# Patient Record
Sex: MALE | Race: WHITE | NOT HISPANIC OR LATINO
[De-identification: names, ages, dates, MRNs, and addresses within clinical notes are randomized per-mention and may not be internally consistent; named-entity substitution may affect disease eponyms.]

---

## 2019-02-27 ENCOUNTER — APPOINTMENT (OUTPATIENT)
Dept: PULMONOLOGY | Facility: CLINIC | Age: 64
End: 2019-02-27
Payer: COMMERCIAL

## 2019-02-27 VITALS
HEIGHT: 70 IN | WEIGHT: 212 LBS | OXYGEN SATURATION: 95 % | BODY MASS INDEX: 30.35 KG/M2 | HEART RATE: 54 BPM | SYSTOLIC BLOOD PRESSURE: 140 MMHG | DIASTOLIC BLOOD PRESSURE: 80 MMHG

## 2019-02-27 DIAGNOSIS — Z86.79 PERSONAL HISTORY OF OTHER DISEASES OF THE CIRCULATORY SYSTEM: ICD-10-CM

## 2019-02-27 DIAGNOSIS — F17.200 NICOTINE DEPENDENCE, UNSPECIFIED, UNCOMPLICATED: ICD-10-CM

## 2019-02-27 DIAGNOSIS — Z86.39 PERSONAL HISTORY OF OTHER ENDOCRINE, NUTRITIONAL AND METABOLIC DISEASE: ICD-10-CM

## 2019-02-27 DIAGNOSIS — Z87.39 PERSONAL HISTORY OF OTHER DISEASES OF THE MUSCULOSKELETAL SYSTEM AND CONNECTIVE TISSUE: ICD-10-CM

## 2019-02-27 PROCEDURE — 99203 OFFICE O/P NEW LOW 30 MIN: CPT | Mod: 25

## 2019-02-27 PROCEDURE — 99406 BEHAV CHNG SMOKING 3-10 MIN: CPT

## 2019-02-27 RX ORDER — OMEPRAZOLE 20 MG/1
20 CAPSULE, DELAYED RELEASE ORAL
Refills: 0 | Status: ACTIVE | COMMUNITY

## 2019-02-27 RX ORDER — QUINAPRIL HYDROCHLORIDE 40 MG/1
40 TABLET, FILM COATED ORAL
Refills: 0 | Status: ACTIVE | COMMUNITY

## 2019-02-27 RX ORDER — MELOXICAM 15 MG/1
15 TABLET ORAL
Refills: 0 | Status: ACTIVE | COMMUNITY

## 2019-02-27 RX ORDER — EZETIMIBE 10 MG/1
10 TABLET ORAL
Refills: 0 | Status: ACTIVE | COMMUNITY

## 2019-02-27 RX ORDER — MULTIVIT-MIN/FOLIC/VIT K/LYCOP 400-300MCG
25 MCG TABLET ORAL
Refills: 0 | Status: ACTIVE | COMMUNITY

## 2019-02-27 RX ORDER — MULTIVIT-MIN/FOLIC/VIT K/LYCOP 400-300MCG
1000 TABLET ORAL
Refills: 0 | Status: ACTIVE | COMMUNITY

## 2019-02-27 RX ORDER — AMINO ACIDS
CAPSULE ORAL
Refills: 0 | Status: ACTIVE | COMMUNITY

## 2019-02-27 NOTE — REVIEW OF SYSTEMS
[As Noted in HPI] : as noted in HPI [Back Pain] : ~T back pain [Arthralgias] : arthralgias [Negative] : Constitutional

## 2019-02-27 NOTE — DISCUSSION/SUMMARY
[FreeTextEntry1] : COPD with anatomic emphysema, complicated by Polycythemia, nicotine addiction\par Nonspecific groundglass infiltrates noted, no current active sputum or fevers\par Will repeat CT scan and reevaluate\par Minute lung nodules will need followup scan also\par Patient could not stay for full PFTs, we'll perform an a.m.\par Normal pulse oximetry on room air, he did not desaturate with exercise\par also followed by Cardiology\par Further evaluation pending above\par Smoking cessation discussed, nicotine replacement 4 minutes\par

## 2019-02-27 NOTE — PROCEDURE
[FreeTextEntry1] : CT scan reviewed; 12/18\par Coronary calcifications\par Significant centrilobular emphysema\par Mild aneurysmal dilatation of aorta at 4.1\par Mild groundglass abnormalities right upper lobe , minimal at lung bases- more atelectasis looking\par Right upper lobe nodule and RML nodule

## 2019-02-27 NOTE — CONSULT LETTER
[Dear  ___] : Dear  [unfilled], [Consult Letter:] : I had the pleasure of evaluating your patient, [unfilled]. [Please see my note below.] : Please see my note below. [Sincerely,] : Sincerely, [FreeTextEntry3] : [facility] DO [facility]\par Pulmonary Critical Care\par Director Pulmonary Division\par Medical Director Respiratory Therapy\par [facility]\par \par

## 2019-02-27 NOTE — PHYSICAL EXAM
[General Appearance - Well Developed] : well developed [General Appearance - Well Nourished] : well nourished [Normal Conjunctiva] : the conjunctiva exhibited no abnormalities [Neck Appearance] : the appearance of the neck was normal [Heart Rate And Rhythm] : heart rate and rhythm were normal [Heart Sounds] : normal S1 and S2 [Murmurs] : no murmurs present [Edema] : no peripheral edema present [Respiration, Rhythm And Depth] : normal respiratory rhythm and effort [Exaggerated Use Of Accessory Muscles For Inspiration] : no accessory muscle use [Lungs Percussion] : the lungs were normal to percussion [FreeTextEntry1] : no chest wall abnormaility [Abnormal Walk] : normal gait [Nail Clubbing] : no clubbing of the fingernails [Cyanosis, Localized] : no localized cyanosis [] : no rash [No Focal Deficits] : no focal deficits [Oriented To Time, Place, And Person] : oriented to person, place, and time [Impaired Insight] : insight and judgment were intact [Affect] : the affect was normal [Memory Recent] : recent memory was not impaired

## 2019-02-27 NOTE — HISTORY OF PRESENT ILLNESS
[FreeTextEntry1] : Significant smoking history\par From 2 packs a day\par Now down to 3 cigarettes a day\par Being followed by hematology for polycythemia, last phlebotomy was a few weeks ago\par No fever chills or chest pain\par Followup by cardiology\par Has exertional dyspnea\par Recent CT scan obtained\par No current purulent sputum

## 2019-02-27 NOTE — REASON FOR VISIT
[Initial Evaluation] : an initial evaluation [Abnormal CT Scan] : abnormal CT Scan [COPD] : COPD [FreeTextEntry2] : lung nodule, lung infiltrate

## 2019-02-28 ENCOUNTER — APPOINTMENT (OUTPATIENT)
Dept: PULMONOLOGY | Facility: CLINIC | Age: 64
End: 2019-02-28

## 2019-02-28 ENCOUNTER — APPOINTMENT (OUTPATIENT)
Dept: PULMONOLOGY | Facility: CLINIC | Age: 64
End: 2019-02-28
Payer: COMMERCIAL

## 2019-02-28 VITALS — RESPIRATION RATE: 12 BRPM | OXYGEN SATURATION: 93 % | HEART RATE: 80 BPM

## 2019-02-28 PROCEDURE — 94729 DIFFUSING CAPACITY: CPT

## 2019-02-28 PROCEDURE — 85018 HEMOGLOBIN: CPT | Mod: QW

## 2019-02-28 PROCEDURE — 99214 OFFICE O/P EST MOD 30 MIN: CPT | Mod: 25

## 2019-02-28 PROCEDURE — 94010 BREATHING CAPACITY TEST: CPT

## 2019-02-28 PROCEDURE — 94727 GAS DIL/WSHOT DETER LNG VOL: CPT

## 2019-02-28 RX ORDER — ALBUTEROL SULFATE 90 UG/1
108 (90 BASE) AEROSOL, METERED RESPIRATORY (INHALATION)
Qty: 1 | Refills: 6 | Status: ACTIVE | COMMUNITY
Start: 2019-02-28 | End: 1900-01-01

## 2019-02-28 NOTE — PHYSICAL EXAM
[General Appearance - Well Developed] : well developed [General Appearance - Well Nourished] : well nourished [Normal Conjunctiva] : the conjunctiva exhibited no abnormalities [Neck Appearance] : the appearance of the neck was normal [Heart Rate And Rhythm] : heart rate and rhythm were normal [Heart Sounds] : normal S1 and S2 [Murmurs] : no murmurs present [Edema] : no peripheral edema present [Respiration, Rhythm And Depth] : normal respiratory rhythm and effort [Exaggerated Use Of Accessory Muscles For Inspiration] : no accessory muscle use [Lungs Percussion] : the lungs were normal to percussion [Abnormal Walk] : normal gait [Nail Clubbing] : no clubbing of the fingernails [Cyanosis, Localized] : no localized cyanosis [No Focal Deficits] : no focal deficits [Oriented To Time, Place, And Person] : oriented to person, place, and time [Impaired Insight] : insight and judgment were intact [Affect] : the affect was normal [Memory Recent] : recent memory was not impaired [] : no rash [Auscultation Breath Sounds / Voice Sounds] : lungs were clear to auscultation bilaterally [FreeTextEntry1] : no chest wall abnormaility

## 2019-02-28 NOTE — REASON FOR VISIT
[Abnormal CT Scan] : abnormal CT Scan [COPD] : COPD [Follow-Up] : a follow-up visit [FreeTextEntry2] : lung nodule, lung infiltrate

## 2019-02-28 NOTE — DISCUSSION/SUMMARY
[FreeTextEntry1] : COPD  Gold class II with asthmatic component, complicated by Polycythemia, nicotine addiction\par Nonspecific ground glass infiltrates noted, anatomic emphysema, nodules\par will treat as exacerbation\par Breo 100 daily and technique reviewed, prednisone, abx, prn rescue\par Will repeat CT scan and reevaluate\par No significant hypoxemia, but suspect smokers polycythemia and cessation stressed 4 minutes\par also followed by Cardiology, Hematology- VASILE 2 negative, normal EPO, MPL mutation neg\par Further evaluation pending above\par 4-6 weeks or sooner if needed\par

## 2019-02-28 NOTE — CONSULT LETTER
[Dear  ___] : Dear  [unfilled], [Consult Letter:] : I had the pleasure of evaluating your patient, [unfilled]. [Please see my note below.] : Please see my note below. [Sincerely,] : Sincerely, [DrHerminio  ___] : Dr. SOL [FreeTextEntry3] : Angel Carval[facility] DO [facility]\par Pulmonary Critical Care\par Director Pulmonary Division\par Medical Director Respiratory Therapy\par [facility]\par \par

## 2019-02-28 NOTE — END OF VISIT
[>50% of Time Spent on Counseling and Coordination of Care for  ___] : Greater than 50% of the encounter time was spent on counseling and coordination of care for [unfilled] [Time Spent: ___ minutes] : I have spent [unfilled] minutes of face to face time with the patient No

## 2019-03-15 ENCOUNTER — MESSAGE (OUTPATIENT)
Age: 64
End: 2019-03-15

## 2019-04-23 ENCOUNTER — APPOINTMENT (OUTPATIENT)
Dept: PULMONOLOGY | Facility: CLINIC | Age: 64
End: 2019-04-23
Payer: COMMERCIAL

## 2019-04-23 VITALS — OXYGEN SATURATION: 95 % | DIASTOLIC BLOOD PRESSURE: 70 MMHG | SYSTOLIC BLOOD PRESSURE: 110 MMHG | HEART RATE: 88 BPM

## 2019-04-23 VITALS — WEIGHT: 214 LBS | BODY MASS INDEX: 30.71 KG/M2

## 2019-04-23 PROCEDURE — 99406 BEHAV CHNG SMOKING 3-10 MIN: CPT

## 2019-04-23 PROCEDURE — 94010 BREATHING CAPACITY TEST: CPT

## 2019-04-23 PROCEDURE — 99214 OFFICE O/P EST MOD 30 MIN: CPT | Mod: 25

## 2019-04-23 RX ORDER — PREDNISONE 10 MG/1
10 TABLET ORAL
Qty: 30 | Refills: 6 | Status: DISCONTINUED | COMMUNITY
Start: 2019-02-28 | End: 2019-04-23

## 2019-04-23 RX ORDER — DOXYCYCLINE HYCLATE 100 MG/1
100 CAPSULE ORAL
Qty: 10 | Refills: 3 | Status: DISCONTINUED | COMMUNITY
Start: 2019-02-28 | End: 2019-04-23

## 2019-04-23 NOTE — PHYSICAL EXAM
[General Appearance - Well Nourished] : well nourished [General Appearance - Well Developed] : well developed [Normal Conjunctiva] : the conjunctiva exhibited no abnormalities [Neck Appearance] : the appearance of the neck was normal [Heart Rate And Rhythm] : heart rate and rhythm were normal [Murmurs] : no murmurs present [Heart Sounds] : normal S1 and S2 [Edema] : no peripheral edema present [Auscultation Breath Sounds / Voice Sounds] : lungs were clear to auscultation bilaterally [Exaggerated Use Of Accessory Muscles For Inspiration] : no accessory muscle use [Respiration, Rhythm And Depth] : normal respiratory rhythm and effort [Lungs Percussion] : the lungs were normal to percussion [Abnormal Walk] : normal gait [Cyanosis, Localized] : no localized cyanosis [Nail Clubbing] : no clubbing of the fingernails [No Focal Deficits] : no focal deficits [Oriented To Time, Place, And Person] : oriented to person, place, and time [Impaired Insight] : insight and judgment were intact [Memory Recent] : recent memory was not impaired [Affect] : the affect was normal [] : no rash [FreeTextEntry1] : no chest wall abnormaility

## 2019-04-23 NOTE — DISCUSSION/SUMMARY
[FreeTextEntry1] : COPD  Gold class II with asthmatic component, complicated by Polycythemia, nicotine addiction\par now down 5 cigarettes daily\par spirometry now normal, exam without bronchospasm, normal sp02\par dyspnea slightly worse per pt, no chest pain, irregular rhythm today\par Cardiology office called, EKG now planned, pt refused, will go in am\par Breo 100 daily , prednisone, abx, prn rescue\par repeat CT scan 3/19 with improved GG nodules, repeat 1 yr planned\par No significant hypoxemia, but suspect smokers polycythemia and cessation stressed 4 minutes\par also followed by Cardiology, Hematology- VASILE 2 negative, normal EPO, MPL mutation neg\par 4 months or sooner if needed\par

## 2019-04-23 NOTE — REASON FOR VISIT
[Follow-Up] : a follow-up visit [Abnormal CT Scan] : abnormal CT Scan [COPD] : COPD [FreeTextEntry2] : lung nodule, lung infiltrate

## 2019-04-23 NOTE — CONSULT LETTER
[Consult Letter:] : I had the pleasure of evaluating your patient, [unfilled]. [Dear  ___] : Dear  [unfilled], [Please see my note below.] : Please see my note below. [Sincerely,] : Sincerely, [DrHerminio  ___] : Dr. SOL [FreeTextEntry3] : [facility] DO [facility]\par Pulmonary Critical Care\par Director Pulmonary Division\par Medical Director Respiratory Therapy\par [facility]\par \par  [DrHerminio ___] : Dr. SOL

## 2019-04-23 NOTE — PROCEDURE
[FreeTextEntry1] : CT 3/19: decreased nodules, f/u 1 yr\par spirometry now normal, sig improvement in FVC and FEV1 from 2/19

## 2019-04-23 NOTE — HISTORY OF PRESENT ILLNESS
[FreeTextEntry1] : Significant smoking history\par From 2 packs a day\par Now down to 3 cigarettes a day\par Being followed by hematology for polycythemia, last phlebotomy was a few weeks ago\par no fever, chill, chest pain\par no sig dyspnea\par using Breo daily

## 2019-07-24 ENCOUNTER — APPOINTMENT (OUTPATIENT)
Dept: PULMONOLOGY | Facility: CLINIC | Age: 64
End: 2019-07-24
Payer: COMMERCIAL

## 2019-07-24 VITALS
BODY MASS INDEX: 29.99 KG/M2 | OXYGEN SATURATION: 94 % | HEART RATE: 80 BPM | SYSTOLIC BLOOD PRESSURE: 114 MMHG | DIASTOLIC BLOOD PRESSURE: 80 MMHG | WEIGHT: 209 LBS

## 2019-07-24 PROCEDURE — 99214 OFFICE O/P EST MOD 30 MIN: CPT

## 2019-07-24 PROCEDURE — 99406 BEHAV CHNG SMOKING 3-10 MIN: CPT

## 2019-07-24 RX ORDER — FLUTICASONE FUROATE AND VILANTEROL TRIFENATATE 100; 25 UG/1; UG/1
100-25 POWDER RESPIRATORY (INHALATION)
Qty: 1 | Refills: 3 | Status: DISCONTINUED | COMMUNITY
Start: 2019-02-28 | End: 2019-07-24

## 2019-07-24 NOTE — CONSULT LETTER
[Dear  ___] : Dear  [unfilled], [Consult Letter:] : I had the pleasure of evaluating your patient, [unfilled]. [Please see my note below.] : Please see my note below. [DrHerminio  ___] : Dr. SOL [Sincerely,] : Sincerely, [DrHerminio ___] : Dr. SOL [FreeTextEntry3] : [facility] DO [facility]\par Pulmonary Critical Care\par Director Pulmonary Division\par Medical Director Respiratory Therapy\par [facility]\par \par

## 2019-07-24 NOTE — PHYSICAL EXAM
[General Appearance - Well Developed] : well developed [Normal Conjunctiva] : the conjunctiva exhibited no abnormalities [General Appearance - Well Nourished] : well nourished [Neck Appearance] : the appearance of the neck was normal [Heart Rate And Rhythm] : heart rate and rhythm were normal [Murmurs] : no murmurs present [Heart Sounds] : normal S1 and S2 [Edema] : no peripheral edema present [Exaggerated Use Of Accessory Muscles For Inspiration] : no accessory muscle use [Respiration, Rhythm And Depth] : normal respiratory rhythm and effort [Lungs Percussion] : the lungs were normal to percussion [Abnormal Walk] : normal gait [Nail Clubbing] : no clubbing of the fingernails [Cyanosis, Localized] : no localized cyanosis [No Focal Deficits] : no focal deficits [Impaired Insight] : insight and judgment were intact [Oriented To Time, Place, And Person] : oriented to person, place, and time [Memory Recent] : recent memory was not impaired [] : no rash [Affect] : the affect was normal [FreeTextEntry1] : no chest wall abnormality

## 2019-07-24 NOTE — REASON FOR VISIT
[Follow-Up] : a follow-up visit [COPD] : COPD [Abnormal CT Scan] : abnormal CT Scan [FreeTextEntry2] : lung nodule, lung infiltrate

## 2019-07-24 NOTE — DISCUSSION/SUMMARY
[FreeTextEntry1] : COPD  Gold class II with asthmatic component, complicated by Polycythemia, nicotine addiction\par spirometry was  normal, exam without bronchospasm, normal sp02\par compliance with Breo 100 daily ,prn rescue\par repeat CT scan 3/19 with improved GG nodules, repeat 1 yr planned\par No significant hypoxemia, but suspect smokers polycythemia and cessation stressed 4 minutes\par also followed by Cardiology, Hematology- VASILE 2 negative, normal EPO, MPL mutation neg\par CXR few crackles R base, denies any acute change in status\par 4-6  months or sooner if needed with spirometry\par

## 2019-07-24 NOTE — HISTORY OF PRESENT ILLNESS
[FreeTextEntry1] : Significant smoking history\par From 2 packs a day\par Now down to 1/2  packs  a day\par Being followed by hematology for polycythemia, last phlebotomy was a few weeks ago\par no fever, chill, chest pain\par no sig dyspnea\par using Breo intermittent

## 2019-07-24 NOTE — REVIEW OF SYSTEMS
[Arthralgias] : arthralgias [As Noted in HPI] : as noted in HPI [Back Pain] : ~T back pain [Negative] : Constitutional

## 2020-01-29 ENCOUNTER — APPOINTMENT (OUTPATIENT)
Dept: PULMONOLOGY | Facility: CLINIC | Age: 65
End: 2020-01-29
Payer: COMMERCIAL

## 2020-01-29 VITALS — HEIGHT: 69 IN | BODY MASS INDEX: 30.81 KG/M2 | WEIGHT: 208 LBS

## 2020-01-29 VITALS — SYSTOLIC BLOOD PRESSURE: 110 MMHG | DIASTOLIC BLOOD PRESSURE: 84 MMHG

## 2020-01-29 VITALS — HEART RATE: 85 BPM | OXYGEN SATURATION: 94 %

## 2020-01-29 VITALS — HEIGHT: 64 IN | BODY MASS INDEX: 53.78 KG/M2 | WEIGHT: 315 LBS

## 2020-01-29 PROCEDURE — 99214 OFFICE O/P EST MOD 30 MIN: CPT | Mod: 25

## 2020-01-29 PROCEDURE — 94060 EVALUATION OF WHEEZING: CPT

## 2020-01-29 PROCEDURE — 99406 BEHAV CHNG SMOKING 3-10 MIN: CPT

## 2020-01-29 PROCEDURE — 94664 DEMO&/EVAL PT USE INHALER: CPT | Mod: 59

## 2020-01-29 NOTE — REVIEW OF SYSTEMS
[As Noted in HPI] : as noted in HPI [Negative] : Constitutional [Arthralgias] : arthralgias [Back Pain] : ~T back pain

## 2020-01-29 NOTE — PROCEDURE
[FreeTextEntry1] : CT 3/19: decreased nodules, f/u 1 yr\par spirometry with normal FVC no obstruction, minimal decrease from 4/19

## 2020-01-29 NOTE — DISCUSSION/SUMMARY
[FreeTextEntry1] : COPD  Gold class II with asthmatic component, complicated by Polycythemia, nicotine addiction\par spirometry without sig change l, exam without bronchospasm, normal sp02\par prn  Breo 100 daily ,prn rescue\par repeat CT scan 3/19 with improved GG nodules, repeat 1 yr planned 3/20\par No significant hypoxemia, but suspect smokers polycythemia and cessation stressed 4 minutes\par also followed by Cardiology, Hematology- VASILE 2 negative, normal EPO, MPL mutation neg, \par Receives phlebotomy regularly Dr Baez\par 4-6  months or sooner if needed with repeat CT\par

## 2020-01-29 NOTE — COUNSELING
[Cessation strategies including cessation program discussed] : Cessation strategies including cessation program discussed [Use of nicotine replacement therapies and other medications discussed] : Use of nicotine replacement therapies and other medications discussed [Risk of tobacco use and health benefits of smoking cessation discussed] : Risk of tobacco use and health benefits of smoking cessation discussed [Tobacco Use Cessation Intermediate Greater Than 3 Minutes Up to 10 Minutes] : Tobacco Use Cessation Intermediate Greater Than 3 Minutes Up to 10 Minutes [FreeTextEntry1] : 4

## 2020-01-29 NOTE — HISTORY OF PRESENT ILLNESS
[>= 30 pack years] : >= 30 pack years [Current] : current [TextBox_4] : Significant smoking history\par From 2 packs a day\par Now down to 1/2  packs  a day\par Being followed by hematology for polycythemia, last phlebotomy was a few weeks ago\par no fever, chill, chest pain\par no sig dyspnea\par not using Breo intermittent [TextBox_11] : 0.5

## 2020-01-29 NOTE — CONSULT LETTER
[Dear  ___] : Dear  [unfilled], [Consult Letter:] : I had the pleasure of evaluating your patient, [unfilled]. [Sincerely,] : Sincerely, [Please see my note below.] : Please see my note below. [DrHerminio ___] : Dr. SOL [DrHerminio  ___] : Dr. SOL [FreeTextEntry3] : [facility] DO [facility]\par Pulmonary Critical Care\par Director Pulmonary Division\par Medical Director Respiratory Therapy\par [facility]\par \par

## 2020-01-29 NOTE — PHYSICAL EXAM
[General Appearance - Well Developed] : well developed [General Appearance - Well Nourished] : well nourished [Normal Conjunctiva] : the conjunctiva exhibited no abnormalities [Neck Appearance] : the appearance of the neck was normal [Murmurs] : no murmurs present [Heart Rate And Rhythm] : heart rate and rhythm were normal [Heart Sounds] : normal S1 and S2 [Edema] : no peripheral edema present [Lungs Percussion] : the lungs were normal to percussion [Respiration, Rhythm And Depth] : normal respiratory rhythm and effort [Exaggerated Use Of Accessory Muscles For Inspiration] : no accessory muscle use [Nail Clubbing] : no clubbing of the fingernails [Abnormal Walk] : normal gait [Cyanosis, Localized] : no localized cyanosis [No Focal Deficits] : no focal deficits [Impaired Insight] : insight and judgment were intact [Affect] : the affect was normal [Oriented To Time, Place, And Person] : oriented to person, place, and time [] : no rash [Memory Recent] : recent memory was not impaired [FreeTextEntry1] : no chest wall abnormality

## 2020-05-27 ENCOUNTER — APPOINTMENT (OUTPATIENT)
Dept: PULMONOLOGY | Facility: CLINIC | Age: 65
End: 2020-05-27
Payer: COMMERCIAL

## 2020-05-27 VITALS
WEIGHT: 216 LBS | BODY MASS INDEX: 31.99 KG/M2 | SYSTOLIC BLOOD PRESSURE: 110 MMHG | DIASTOLIC BLOOD PRESSURE: 70 MMHG | HEART RATE: 74 BPM | HEIGHT: 69 IN | OXYGEN SATURATION: 95 %

## 2020-05-27 PROCEDURE — 99214 OFFICE O/P EST MOD 30 MIN: CPT

## 2020-05-27 NOTE — DISCUSSION/SUMMARY
[FreeTextEntry1] : COPD  Gold class II with asthmatic component, complicated by Polycythemia, nicotine addiction\par spirometry without sig change l, exam without bronchospasm, normal sp02\par prn  Breo 100 daily ,prn rescue\par repeat CT scan 3/20 with stable nodules, repeat 1 yr planned 3/20\par No significant hypoxemia, but suspect smokers polycythemia and cessation stressed 4 minutes\par  followed by Hematology- VASILE 2 negative, normal EPO, MPL mutation neg, \par Receives phlebotomy regularly Dr Baez\par Heart rate intermittently irregular, Cardiology called, they will see today\par 4-6  months or sooner if needed with PFTs\par

## 2020-05-27 NOTE — PROCEDURE
[FreeTextEntry1] : CT 3/20: stable nodules\par spirometry with normal FVC no obstruction, minimal decrease from 4/19

## 2020-05-27 NOTE — HISTORY OF PRESENT ILLNESS
[Current] : current [>= 30 pack years] : >= 30 pack years [TextBox_4] : Significant smoking history\par Now down to 1 packs  a day\par Being followed by hematology for polycythemia, last phlebotomy was a few weeks ago\par no fever, chill, chest pain\par no sig dyspnea\par  using Breo intermittent [TextBox_11] : 0.5

## 2020-05-27 NOTE — COUNSELING
[Risk of tobacco use and health benefits of smoking cessation discussed] : Risk of tobacco use and health benefits of smoking cessation discussed [Cessation strategies including cessation program discussed] : Cessation strategies including cessation program discussed [Use of nicotine replacement therapies and other medications discussed] : Use of nicotine replacement therapies and other medications discussed [FreeTextEntry1] : 4

## 2020-05-27 NOTE — CONSULT LETTER
[Dear  ___] : Dear  [unfilled], [Consult Letter:] : I had the pleasure of evaluating your patient, [unfilled]. [Please see my note below.] : Please see my note below. [Sincerely,] : Sincerely, [DrHerminio  ___] : Dr. SOL [DrHerminio ___] : Dr. SOL [FreeTextEntry3] : Angel Carvalho DO [facility]\par Pulmonary Critical Care\par Director Pulmonary Division\par Medical Director Respiratory Therapy\par [facility]\par \par

## 2020-11-04 ENCOUNTER — APPOINTMENT (OUTPATIENT)
Dept: PULMONOLOGY | Facility: CLINIC | Age: 65
End: 2020-11-04

## 2020-11-18 ENCOUNTER — APPOINTMENT (OUTPATIENT)
Dept: PULMONOLOGY | Facility: CLINIC | Age: 65
End: 2020-11-18
Payer: COMMERCIAL

## 2020-11-18 VITALS — WEIGHT: 200 LBS | BODY MASS INDEX: 29.54 KG/M2

## 2020-11-18 VITALS — HEART RATE: 83 BPM | SYSTOLIC BLOOD PRESSURE: 122 MMHG | OXYGEN SATURATION: 96 % | DIASTOLIC BLOOD PRESSURE: 80 MMHG

## 2020-11-18 DIAGNOSIS — R91.8 OTHER NONSPECIFIC ABNORMAL FINDING OF LUNG FIELD: ICD-10-CM

## 2020-11-18 PROCEDURE — 99214 OFFICE O/P EST MOD 30 MIN: CPT | Mod: 25

## 2020-11-18 PROCEDURE — 99406 BEHAV CHNG SMOKING 3-10 MIN: CPT

## 2020-11-18 RX ORDER — FLUTICASONE FUROATE AND VILANTEROL TRIFENATATE 100; 25 UG/1; UG/1
100-25 POWDER RESPIRATORY (INHALATION)
Qty: 3 | Refills: 1 | Status: DISCONTINUED | COMMUNITY
Start: 2019-04-23 | End: 2020-11-18

## 2020-11-18 NOTE — DISCUSSION/SUMMARY
[FreeTextEntry1] : COPD  Gold class II with asthmatic component, complicated by Polycythemia, nicotine addiction\par spirometry without sig change l, exam without bronchospasm, normal sp02\par prn  Breo 100 daily \par repeat CT scan 3/20 with stable nodules, repeat 1 yr planned 3/21\par No significant hypoxemia, but suspect smokers polycythemia and cessation stressed 4 minutes\par  followed by Hematology- VASILE 2 negative, normal EPO, MPL mutation neg, \par Receives phlebotomy regularly Dr Baez\par Cardiology following\par 4-6  months or sooner if needed with PFTs, will call with CT scan\par

## 2020-11-18 NOTE — PHYSICAL EXAM
[General Appearance - Well Developed] : well developed [General Appearance - Well Nourished] : well nourished [Normal Conjunctiva] : the conjunctiva exhibited no abnormalities [Neck Appearance] : the appearance of the neck was normal [Heart Rate And Rhythm] : heart rate and rhythm were normal [Heart Sounds] : normal S1 and S2 [Murmurs] : no murmurs present [Edema] : no peripheral edema present [Respiration, Rhythm And Depth] : normal respiratory rhythm and effort [Exaggerated Use Of Accessory Muscles For Inspiration] : no accessory muscle use [Lungs Percussion] : the lungs were normal to percussion [Abnormal Walk] : normal gait [Nail Clubbing] : no clubbing of the fingernails [Cyanosis, Localized] : no localized cyanosis [No Focal Deficits] : no focal deficits [Oriented To Time, Place, And Person] : oriented to person, place, and time [Impaired Insight] : insight and judgment were intact [Affect] : the affect was normal [Memory Recent] : recent memory was not impaired [] : no rash [FreeTextEntry1] : no chest wall abnormality

## 2020-11-18 NOTE — HISTORY OF PRESENT ILLNESS
[Current] : current [>= 30 pack years] : >= 30 pack years [TextBox_4] : Significant smoking history\par Now down to 1/2 packs  a day\par Being followed by hematology for polycythemia, last phlebotomy was a 4 weeks ago\par no fever, chill, chest pain\par no sig dyspnea\par  not using Breo [TextBox_11] : 0.5

## 2020-11-18 NOTE — CONSULT LETTER
[Dear  ___] : Dear  [unfilled], [Consult Letter:] : I had the pleasure of evaluating your patient, [unfilled]. [Please see my note below.] : Please see my note below. [Sincerely,] : Sincerely, [DrHerminio  ___] : Dr. SOL [DrHerminio ___] : Dr. SOL [FreeTextEntry3] : [facility]\par Pulmonary Critical Care\par Director Pulmonary Division\par Medical Director Respiratory Therapy\par [facility]\par \par

## 2020-11-18 NOTE — COUNSELING
[Risk of tobacco use and health benefits of smoking cessation discussed] : Risk of tobacco use and health benefits of smoking cessation discussed [Cessation strategies including cessation program discussed] : Cessation strategies including cessation program discussed [Use of nicotine replacement therapies and other medications discussed] : Use of nicotine replacement therapies and other medications discussed [Tobacco Use Cessation Intermediate Greater Than 3 Minutes Up to 10 Minutes] : Tobacco Use Cessation Intermediate Greater Than 3 Minutes Up to 10 Minutes [FreeTextEntry1] : 4

## 2020-11-24 ENCOUNTER — APPOINTMENT (OUTPATIENT)
Dept: GASTROENTEROLOGY | Facility: CLINIC | Age: 65
End: 2020-11-24
Payer: COMMERCIAL

## 2020-11-24 VITALS
RESPIRATION RATE: 14 BRPM | BODY MASS INDEX: 29.33 KG/M2 | OXYGEN SATURATION: 97 % | HEIGHT: 69 IN | WEIGHT: 198 LBS | SYSTOLIC BLOOD PRESSURE: 110 MMHG | DIASTOLIC BLOOD PRESSURE: 68 MMHG | TEMPERATURE: 97.6 F | HEART RATE: 51 BPM

## 2020-11-24 LAB
ALBUMIN SERPL ELPH-MCNC: 4.7 G/DL
ALP BLD-CCNC: 61 U/L
ALT SERPL-CCNC: 14 U/L
ANION GAP SERPL CALC-SCNC: 15 MMOL/L
AST SERPL-CCNC: 22 U/L
BASOPHILS # BLD AUTO: 0.15 K/UL
BASOPHILS NFR BLD AUTO: 1.9 %
BILIRUB SERPL-MCNC: 0.9 MG/DL
BUN SERPL-MCNC: 27 MG/DL
CALCIUM SERPL-MCNC: 10 MG/DL
CHLORIDE SERPL-SCNC: 99 MMOL/L
CO2 SERPL-SCNC: 20 MMOL/L
CREAT SERPL-MCNC: 1.03 MG/DL
CRP SERPL-MCNC: 0.39 MG/DL
EOSINOPHIL # BLD AUTO: 0.26 K/UL
EOSINOPHIL NFR BLD AUTO: 3.3 %
GLUCOSE SERPL-MCNC: 95 MG/DL
HCT VFR BLD CALC: 56 %
HGB BLD-MCNC: 18.3 G/DL
IMM GRANULOCYTES NFR BLD AUTO: 2.9 %
INR PPP: 1.04 RATIO
LYMPHOCYTES # BLD AUTO: 1.57 K/UL
LYMPHOCYTES NFR BLD AUTO: 20.1 %
MAN DIFF?: NORMAL
MCHC RBC-ENTMCNC: 25.9 PG
MCHC RBC-ENTMCNC: 32.7 GM/DL
MCV RBC AUTO: 79.3 FL
MONOCYTES # BLD AUTO: 0.94 K/UL
MONOCYTES NFR BLD AUTO: 12 %
NEUTROPHILS # BLD AUTO: 4.67 K/UL
NEUTROPHILS NFR BLD AUTO: 59.8 %
PLATELET # BLD AUTO: 341 K/UL
POTASSIUM SERPL-SCNC: 4.4 MMOL/L
PROT SERPL-MCNC: 7.3 G/DL
PT BLD: 12.2 SEC
RBC # BLD: 7.06 M/UL
RBC # FLD: 15.9 %
SODIUM SERPL-SCNC: 134 MMOL/L
WBC # FLD AUTO: 7.82 K/UL

## 2020-11-24 PROCEDURE — 99203 OFFICE O/P NEW LOW 30 MIN: CPT

## 2020-11-24 RX ORDER — VITAMIN A 10000 UNIT
TABLET ORAL
Refills: 0 | Status: DISCONTINUED | COMMUNITY
End: 2020-11-24

## 2020-11-24 NOTE — ASSESSMENT
[FreeTextEntry1] : Due to history of mucus in the stool, and loose stools, there is a possibility of inflammatory bowel disease. As the patient never had a colonoscopy before, I am recommending to have a colonoscopy performed. The bowel preparation was discussed at length. Risks (including bleeding, pain, perforation, incomplete examination, splenic laceration, adverse reactions to medications, aspiration and death), benefits and alternatives were discussed. Patient is agreeable for the colonoscopy. The patient is medically optimized for the procedure pending cardiology evaluation. We will schedule the patient for the procedure. Bowel preparation was sent to the pharmacy.\par \par Gabriel Rodney MD\par Gastroenterology \par \par \par

## 2020-11-24 NOTE — PHYSICAL EXAM
[General Appearance - Alert] : alert [General Appearance - In No Acute Distress] : in no acute distress [Sclera] : the sclera and conjunctiva were normal [PERRL With Normal Accommodation] : pupils were equal in size, round, and reactive to light [Extraocular Movements] : extraocular movements were intact [Outer Ear] : the ears and nose were normal in appearance [Oropharynx] : the oropharynx was normal [Neck Appearance] : the appearance of the neck was normal [Neck Cervical Mass (___cm)] : no neck mass was observed [Jugular Venous Distention Increased] : there was no jugular-venous distention [Thyroid Diffuse Enlargement] : the thyroid was not enlarged [Thyroid Nodule] : there were no palpable thyroid nodules [Auscultation Breath Sounds / Voice Sounds] : lungs were clear to auscultation bilaterally [Heart Rate And Rhythm] : heart rate was normal and rhythm regular [Heart Sounds] : normal S1 and S2 [Heart Sounds Gallop] : no gallops [Murmurs] : no murmurs [Heart Sounds Pericardial Friction Rub] : no pericardial rub [Bowel Sounds] : normal bowel sounds [Abdomen Soft] : soft [Abdomen Tenderness] : non-tender [Abdomen Mass (___ Cm)] : no abdominal mass palpated [Cervical Lymph Nodes Enlarged Posterior Bilaterally] : posterior cervical [Cervical Lymph Nodes Enlarged Anterior Bilaterally] : anterior cervical [Supraclavicular Lymph Nodes Enlarged Bilaterally] : supraclavicular [No CVA Tenderness] : no ~M costovertebral angle tenderness [No Spinal Tenderness] : no spinal tenderness [Abnormal Walk] : normal gait [Nail Clubbing] : no clubbing  or cyanosis of the fingernails [Musculoskeletal - Swelling] : no joint swelling seen [Motor Tone] : muscle strength and tone were normal [Skin Color & Pigmentation] : normal skin color and pigmentation [Skin Turgor] : normal skin turgor [] : no rash [No Focal Deficits] : no focal deficits [Oriented To Time, Place, And Person] : oriented to person, place, and time [Impaired Insight] : insight and judgment were intact [Affect] : the affect was normal [Normal Sphincter Tone] : normal sphincter tone [No Rectal Mass] : no rectal mass [Internal Hemorrhoid] : internal hemorrhoids [External Hemorrhoid] : external hemorrhoids [FreeTextEntry1] : Prolaspsed hemorrhoids

## 2020-11-24 NOTE — HISTORY OF PRESENT ILLNESS
[de-identified] : Patient arrived for evaluation for colonoscopy. He has been complaining of loose stools and mucus in stools for many years. Passes 5-6 loose BM per day. No rectal bleeding. No previous colonoscopy. He has history of COPD, CAD. No active issues currently. No weight loss. No family history of colorectal cancer.

## 2020-12-06 LAB
BACTERIA STL CULT: NORMAL
C DIFF TOX GENS STL QL NAA+PROBE: NORMAL
CDIFF BY PCR: NOT DETECTED
DEPRECATED O AND P PREP STL: NORMAL
G LAMBLIA AG STL QL: NORMAL
GI PCR PANEL, STOOL: NORMAL

## 2020-12-26 ENCOUNTER — APPOINTMENT (OUTPATIENT)
Dept: DISASTER EMERGENCY | Facility: CLINIC | Age: 65
End: 2020-12-26

## 2020-12-26 DIAGNOSIS — Z01.818 ENCOUNTER FOR OTHER PREPROCEDURAL EXAMINATION: ICD-10-CM

## 2020-12-27 LAB — SARS-COV-2 N GENE NPH QL NAA+PROBE: NOT DETECTED

## 2020-12-29 ENCOUNTER — APPOINTMENT (OUTPATIENT)
Dept: GASTROENTEROLOGY | Facility: GI CENTER | Age: 65
End: 2020-12-29
Payer: COMMERCIAL

## 2020-12-29 ENCOUNTER — OUTPATIENT (OUTPATIENT)
Dept: OUTPATIENT SERVICES | Facility: HOSPITAL | Age: 65
LOS: 1 days | End: 2020-12-29
Payer: COMMERCIAL

## 2020-12-29 ENCOUNTER — RESULT REVIEW (OUTPATIENT)
Age: 65
End: 2020-12-29

## 2020-12-29 DIAGNOSIS — R19.8 OTHER SPECIFIED SYMPTOMS AND SIGNS INVOLVING THE DIGESTIVE SYSTEM AND ABDOMEN: ICD-10-CM

## 2020-12-29 DIAGNOSIS — K64.8 OTHER HEMORRHOIDS: ICD-10-CM

## 2020-12-29 PROCEDURE — 88305 TISSUE EXAM BY PATHOLOGIST: CPT | Mod: 26

## 2020-12-29 PROCEDURE — 45385 COLONOSCOPY W/LESION REMOVAL: CPT

## 2020-12-29 PROCEDURE — 88305 TISSUE EXAM BY PATHOLOGIST: CPT

## 2020-12-29 PROCEDURE — 45380 COLONOSCOPY AND BIOPSY: CPT | Mod: XS

## 2020-12-29 PROCEDURE — 45380 COLONOSCOPY AND BIOPSY: CPT | Mod: 59

## 2020-12-29 NOTE — PROCEDURE
[With Biopsy] : with biopsy [With Polypectomy] : polypectomy [Change in Bowel Habits] : change in bowel habits [Procedure Explained] : The procedure was explained [Allergies Reviewed] : allergies reviewed. [Risks] : Risks [Benefits] : benefits [Alternatives] : alternatives [Consent Obtained] : written consent was obtained prior to the procedure and is detailed in the patient's record [Patient] : the patient [Bowel Prep Kit] : the patient took the appropriate bowel preparation kit as directed [Approved Diet Followed] : the patient avoided solid foods and adhered to the approved diet list for 24 hours prior to the procedure [Automated Blood Pressure Cuff] : automated blood pressure cuff [Cardiac Monitor] : cardiac monitor [Pulse Oximeter] : pulse oximeter [Prep Qualtiy: ___] : Prep Quality:  [unfilled] [Withdrawal Time: ___] : Withdrawal Time:  [unfilled] [Performed By: ___] : Performed by:  SWETA [Left Lateral Decubitus] : The patient was positioned in the left lateral decubitus position [Normal Prostate] : a normal prostate [Cecum (Landmarks)] : and guided to the cecum which was identified by the anatomic landmarks of the appendiceal orifice and ileocecal valve [Terminal Ileum via Ileocecal Valve] : and the terminal ileum was examined by entering the ileocecal valve [No Difficulty] : without difficulty [Insufflated] : insufflated [Single Pass Needed] : after a single pass [Retroflex View] : a retroflex view of the rectum was performed [Cold Snare Polypectomy] : cold snare polypectomy [Normal] : Normal [Diverticulosis] : diverticulosis [Polyps] : polyps [Hemorrhoids] : hemorrhoids [Biopsy] : biopsy [Sent to Pathology] : was sent to pathology for analysis [Tolerated Well] : the patient tolerated the procedure well [Vital Signs Stable] : the vital signs were stable [No Complications] : There were no complications [Abnormal Rectum] : a normal rectum [External Hemorrhoids] : no external hemorrhoids [Patient Rotated Into Alternating Positions] : the patient was not rotated [de-identified] : Mucus in stools [de-identified] : Normal terminal ileum [de-identified] : 3 mm polyp s/p cold biopsy forceps polypectomy [de-identified] : Two 5 mm polyps s/p cold snare\par One 3 mm polyp s/p cold biopsy forceps polypectomy [de-identified] : One 5 mm polyps s/p cold snare\par Two 3 mm polyp s/p cold biopsy forceps polypectomy [de-identified] : Ascending colon polyp; Transverse colon polyps; Sigmoid colon polyps; Random colon; Rectum

## 2020-12-29 NOTE — PHYSICAL EXAM
[General Appearance - Alert] : alert [General Appearance - In No Acute Distress] : in no acute distress [Sclera] : the sclera and conjunctiva were normal [PERRL With Normal Accommodation] : pupils were equal in size, round, and reactive to light [Extraocular Movements] : extraocular movements were intact [Outer Ear] : the ears and nose were normal in appearance [Oropharynx] : the oropharynx was normal [Neck Appearance] : the appearance of the neck was normal [Neck Cervical Mass (___cm)] : no neck mass was observed [Jugular Venous Distention Increased] : there was no jugular-venous distention [Thyroid Diffuse Enlargement] : the thyroid was not enlarged [Thyroid Nodule] : there were no palpable thyroid nodules [Auscultation Breath Sounds / Voice Sounds] : lungs were clear to auscultation bilaterally [Heart Rate And Rhythm] : heart rate was normal and rhythm regular [Heart Sounds] : normal S1 and S2 [Heart Sounds Gallop] : no gallops [Murmurs] : no murmurs [Heart Sounds Pericardial Friction Rub] : no pericardial rub [Bowel Sounds] : normal bowel sounds [Abdomen Soft] : soft [Abdomen Tenderness] : non-tender [Abdomen Mass (___ Cm)] : no abdominal mass palpated [Normal Sphincter Tone] : normal sphincter tone [No Rectal Mass] : no rectal mass [Internal Hemorrhoid] : internal hemorrhoids [External Hemorrhoid] : external hemorrhoids [FreeTextEntry1] : Prolaspsed hemorrhoids [Cervical Lymph Nodes Enlarged Posterior Bilaterally] : posterior cervical [Cervical Lymph Nodes Enlarged Anterior Bilaterally] : anterior cervical [Supraclavicular Lymph Nodes Enlarged Bilaterally] : supraclavicular [No CVA Tenderness] : no ~M costovertebral angle tenderness [No Spinal Tenderness] : no spinal tenderness [Abnormal Walk] : normal gait [Nail Clubbing] : no clubbing  or cyanosis of the fingernails [Musculoskeletal - Swelling] : no joint swelling seen [Motor Tone] : muscle strength and tone were normal [Skin Color & Pigmentation] : normal skin color and pigmentation [Skin Turgor] : normal skin turgor [] : no rash [No Focal Deficits] : no focal deficits [Oriented To Time, Place, And Person] : oriented to person, place, and time [Impaired Insight] : insight and judgment were intact [Affect] : the affect was normal

## 2020-12-29 NOTE — PROCEDURE
[With Biopsy] : with biopsy [With Polypectomy] : polypectomy [Change in Bowel Habits] : change in bowel habits [Procedure Explained] : The procedure was explained [Allergies Reviewed] : allergies reviewed. [Risks] : Risks [Benefits] : benefits [Alternatives] : alternatives [Consent Obtained] : written consent was obtained prior to the procedure and is detailed in the patient's record [Patient] : the patient [Bowel Prep Kit] : the patient took the appropriate bowel preparation kit as directed [Approved Diet Followed] : the patient avoided solid foods and adhered to the approved diet list for 24 hours prior to the procedure [Automated Blood Pressure Cuff] : automated blood pressure cuff [Cardiac Monitor] : cardiac monitor [Pulse Oximeter] : pulse oximeter [Prep Qualtiy: ___] : Prep Quality:  [unfilled] [Withdrawal Time: ___] : Withdrawal Time:  [unfilled] [Performed By: ___] : Performed by:  SWETA [Left Lateral Decubitus] : The patient was positioned in the left lateral decubitus position [Normal Prostate] : a normal prostate [Cecum (Landmarks)] : and guided to the cecum which was identified by the anatomic landmarks of the appendiceal orifice and ileocecal valve [Terminal Ileum via Ileocecal Valve] : and the terminal ileum was examined by entering the ileocecal valve [No Difficulty] : without difficulty [Insufflated] : insufflated [Single Pass Needed] : after a single pass [Retroflex View] : a retroflex view of the rectum was performed [Cold Snare Polypectomy] : cold snare polypectomy [Normal] : Normal [Diverticulosis] : diverticulosis [Polyps] : polyps [Hemorrhoids] : hemorrhoids [Biopsy] : biopsy [Sent to Pathology] : was sent to pathology for analysis [Tolerated Well] : the patient tolerated the procedure well [Vital Signs Stable] : the vital signs were stable [No Complications] : There were no complications [Abnormal Rectum] : a normal rectum [External Hemorrhoids] : no external hemorrhoids [Patient Rotated Into Alternating Positions] : the patient was not rotated [de-identified] : Mucus in stools [de-identified] : Normal terminal ileum [de-identified] : 3 mm polyp s/p cold biopsy forceps polypectomy [de-identified] : Two 5 mm polyps s/p cold snare\par One 3 mm polyp s/p cold biopsy forceps polypectomy [de-identified] : One 5 mm polyps s/p cold snare\par Two 3 mm polyp s/p cold biopsy forceps polypectomy [de-identified] : Ascending colon polyp; Transverse colon polyps; Sigmoid colon polyps; Random colon; Rectum

## 2020-12-29 NOTE — ASSESSMENT
[FreeTextEntry1] : IMPRESSION:\par Multiple colon polyps\par Fair bowel preparation\par Inflammed hemorrhoids\par \par \par RECOMMENDATIONS:\par High fiber diet\par Anusol suppositories\par Await pathology results\par Repeat colonoscopy in 1-2 years\par

## 2020-12-31 LAB — SURGICAL PATHOLOGY STUDY: SIGNIFICANT CHANGE UP

## 2021-01-06 ENCOUNTER — NON-APPOINTMENT (OUTPATIENT)
Age: 66
End: 2021-01-06

## 2021-02-04 ENCOUNTER — NON-APPOINTMENT (OUTPATIENT)
Age: 66
End: 2021-02-04

## 2021-02-04 VITALS — HEIGHT: 69 IN | WEIGHT: 200 LBS | BODY MASS INDEX: 29.62 KG/M2

## 2021-02-04 NOTE — HISTORY OF PRESENT ILLNESS
[TextBox_13] : Referred by Dr. Angel Carvalho.\par \par Mr. TONY is a 65 year old male with a history of HTN, high cholesterol, CAD, COPD and emphysema.\par \par He was called to review eligibility for Low-Dose CT lung cancer screening.  Reviewed and confirmed that the patient meets screening eligibility criteria:\par \par 65 years old \par \par Smoking Status: Current Smoker\par \par Number of pack(s) per day: 1\par Number of years smoked: 50\par Number of pack years smokin\par \par Mr. TONY denies any symptoms of lung cancer, including new cough, change in cough, hemoptysis, and unintentional weight loss.\par \par Mr. TONY denies any personal history of lung cancer.  No lung cancer in a first degree relative.  Denies any history of lung disease or any history of occupational exposures.

## 2021-03-02 ENCOUNTER — APPOINTMENT (OUTPATIENT)
Dept: CT IMAGING | Facility: CLINIC | Age: 66
End: 2021-03-02

## 2021-03-08 ENCOUNTER — APPOINTMENT (OUTPATIENT)
Dept: GASTROENTEROLOGY | Facility: CLINIC | Age: 66
End: 2021-03-08
Payer: COMMERCIAL

## 2021-03-08 VITALS
DIASTOLIC BLOOD PRESSURE: 85 MMHG | SYSTOLIC BLOOD PRESSURE: 123 MMHG | HEART RATE: 89 BPM | WEIGHT: 197 LBS | OXYGEN SATURATION: 98 % | HEIGHT: 69 IN | BODY MASS INDEX: 29.18 KG/M2 | TEMPERATURE: 97.6 F | RESPIRATION RATE: 14 BRPM

## 2021-03-08 DIAGNOSIS — R19.5 OTHER FECAL ABNORMALITIES: ICD-10-CM

## 2021-03-08 DIAGNOSIS — K63.5 POLYP OF COLON: ICD-10-CM

## 2021-03-08 PROCEDURE — 99213 OFFICE O/P EST LOW 20 MIN: CPT

## 2021-03-08 PROCEDURE — 99072 ADDL SUPL MATRL&STAF TM PHE: CPT

## 2021-03-08 NOTE — ASSESSMENT
[FreeTextEntry1] : At this time, I have recommended to consider a upper endoscopy due to long standing history of reflux.  Regarding the mucus in the stool, I have recommended to try Metamucil fiber supplementation and also probiotic, align.\par \par Risks (including bleeding, pain, perforation, incomplete examination, adverse reactions to medications, aspiration and death), benefits and alternatives were discussed. Patient is agreeable for the EGD. The patient is medically optimized for the procedure. We will schedule the patient for the procedure.\par \par Gabriel Rodney MD\par Gastroenterology \par \par

## 2021-03-08 NOTE — HISTORY OF PRESENT ILLNESS
[de-identified] : Patient arrived for evaluation after colonoscopy.  He had multiple colon polyps on colonoscopy. 1-2 year follow up was recommended.   He has history of COPD, CAD. No active issues currently. No weight loss. No family history of colorectal cancer.  The patient has been complaining of significant acid reflux.  No endoscopy in the past.  He still has some mucus in the stool and loose stools.

## 2021-04-15 ENCOUNTER — APPOINTMENT (OUTPATIENT)
Dept: PULMONOLOGY | Facility: CLINIC | Age: 66
End: 2021-04-15

## 2022-12-02 NOTE — PHYSICAL EXAM
[General Appearance - Well Nourished] : well nourished [General Appearance - Well Developed] : well developed [Normal Conjunctiva] : the conjunctiva exhibited no abnormalities [Neck Appearance] : the appearance of the neck was normal [Heart Rate And Rhythm] : heart rate and rhythm were normal [Murmurs] : no murmurs present [Heart Sounds] : normal S1 and S2 [Edema] : no peripheral edema present [Respiration, Rhythm And Depth] : normal respiratory rhythm and effort [Exaggerated Use Of Accessory Muscles For Inspiration] : no accessory muscle use [Lungs Percussion] : the lungs were normal to percussion [Abnormal Walk] : normal gait [Nail Clubbing] : no clubbing of the fingernails [Cyanosis, Localized] : no localized cyanosis [No Focal Deficits] : no focal deficits [Oriented To Time, Place, And Person] : oriented to person, place, and time [Impaired Insight] : insight and judgment were intact [Affect] : the affect was normal [Memory Recent] : recent memory was not impaired [] : no rash [FreeTextEntry1] : rare crackles R base FRANCISCO JAVIER

## 2023-03-08 ENCOUNTER — NON-APPOINTMENT (OUTPATIENT)
Age: 68
End: 2023-03-08

## 2023-03-22 ENCOUNTER — APPOINTMENT (OUTPATIENT)
Dept: FAMILY MEDICINE | Facility: CLINIC | Age: 68
End: 2023-03-22
Payer: COMMERCIAL

## 2023-03-22 ENCOUNTER — LABORATORY RESULT (OUTPATIENT)
Age: 68
End: 2023-03-22

## 2023-03-22 VITALS
BODY MASS INDEX: 29.62 KG/M2 | WEIGHT: 200 LBS | HEIGHT: 69 IN | HEART RATE: 95 BPM | OXYGEN SATURATION: 98 % | SYSTOLIC BLOOD PRESSURE: 124 MMHG | DIASTOLIC BLOOD PRESSURE: 80 MMHG | RESPIRATION RATE: 14 BRPM

## 2023-03-22 DIAGNOSIS — Z00.00 ENCOUNTER FOR GENERAL ADULT MEDICAL EXAMINATION W/OUT ABNORMAL FINDINGS: ICD-10-CM

## 2023-03-22 DIAGNOSIS — J30.9 ALLERGIC RHINITIS, UNSPECIFIED: ICD-10-CM

## 2023-03-22 DIAGNOSIS — R91.8 OTHER NONSPECIFIC ABNORMAL FINDING OF LUNG FIELD: ICD-10-CM

## 2023-03-22 PROCEDURE — 99387 INIT PM E/M NEW PAT 65+ YRS: CPT | Mod: 25

## 2023-03-22 PROCEDURE — 36415 COLL VENOUS BLD VENIPUNCTURE: CPT

## 2023-03-22 RX ORDER — HYDROCHLOROTHIAZIDE 25 MG/1
25 TABLET ORAL
Qty: 30 | Refills: 0 | Status: ACTIVE | COMMUNITY
Start: 1900-01-01 | End: 1900-01-01

## 2023-03-22 RX ORDER — FLUTICASONE PROPIONATE 50 UG/1
50 SPRAY, METERED NASAL DAILY
Qty: 1 | Refills: 1 | Status: ACTIVE | COMMUNITY

## 2023-03-22 NOTE — HEALTH RISK ASSESSMENT
[Current] : Current [Good] : ~his/her~  mood as  good [2 - 4 times a month (2 pts)] : 2-4 times a month (2 points) [1 or 2 (0 pts)] : 1 or 2 (0 points) [Never (0 pts)] : Never (0 points) [No] : In the past 12 months have you used drugs other than those required for medical reasons? No [No falls in past year] : Patient reported no falls in the past year [0] : 2) Feeling down, depressed, or hopeless: Not at all (0) [PHQ-2 Negative - No further assessment needed] : PHQ-2 Negative - No further assessment needed [Audit-CScore] : 3 [[id number]] : 0 [HIV Test offered] : HIV Test offered [Hepatitis C test offered] : Hepatitis C test offered [Change in mental status noted] : No change in mental status noted [Language] : denies difficulty with language [None] : None [With Family] : lives with family [Employed] : employed [College] : College [Feels Safe at Home] : Feels safe at home [Neglect Or Abandonment] : neglect or abandonment [Fully functional (bathing, dressing, toileting, transferring, walking, feeding)] : Fully functional (bathing, dressing, toileting, transferring, walking, feeding) [Fully functional (using the telephone, shopping, preparing meals, housekeeping, doing laundry, using] : Fully functional and needs no help or supervision to perform IADLs (using the telephone, shopping, preparing meals, housekeeping, doing laundry, using transportation, managing medications and managing finances) [Reports changes in hearing] : Reports no changes in hearing [Reports changes in vision] : Reports no changes in vision [Smoke Detector] : smoke detector [Safety elements used in home] : safety elements used in home [Patient/Caregiver not ready to engage] : , patient/caregiver not ready to engage

## 2023-03-22 NOTE — COUNSELING
[Engage in a relaxing activity] : Engage in a relaxing activity [Yes] : Risk of tobacco use and health benefits of smoking cessation discussed: Yes [Cessation strategies including cessation program discussed] : Cessation strategies including cessation program discussed [No] : Not willing to quit smoking

## 2023-03-22 NOTE — HISTORY OF PRESENT ILLNESS
Medication refilled per protocol and sent to requested pharmacy. Ordering provider: ROSS Mcfarlane.    [FreeTextEntry1] : CPE\par  [de-identified] : 68 y/o male with PMH GERD, HLD, HTN, COPD that present for physical. Patient is current smoker, last chest x ray was in 2019. Patient complaining of intermittent diarrhea.

## 2023-03-22 NOTE — ASSESSMENT
[FreeTextEntry1] : 68 y/o male with PMH GERD, HLD, HTN, COPD that present for physical. Patient is current smoker, last chest x ray was in 2019. Patient complaining of intermittent diarrhea.\par stop smoking\par l1-4 ppd in life ave 1.5 4o years \par care plan reviewed\par meds reviewed\par labs obtained\par chest x ray\par \par

## 2023-03-23 LAB
ALBUMIN SERPL ELPH-MCNC: 4.2 G/DL
ALP BLD-CCNC: 54 U/L
ALT SERPL-CCNC: 13 U/L
ANION GAP SERPL CALC-SCNC: 17 MMOL/L
APPEARANCE: CLEAR
AST SERPL-CCNC: 17 U/L
BILIRUB SERPL-MCNC: 0.5 MG/DL
BILIRUBIN URINE: NEGATIVE
BLOOD URINE: NEGATIVE
BUN SERPL-MCNC: 25 MG/DL
CALCIUM SERPL-MCNC: 10.1 MG/DL
CHLORIDE SERPL-SCNC: 102 MMOL/L
CHOLEST SERPL-MCNC: 219 MG/DL
CO2 SERPL-SCNC: 16 MMOL/L
COLOR: NORMAL
CREAT SERPL-MCNC: 0.98 MG/DL
EGFR: 85 ML/MIN/1.73M2
GLUCOSE QUALITATIVE U: NEGATIVE
GLUCOSE SERPL-MCNC: 116 MG/DL
HDLC SERPL-MCNC: 36 MG/DL
KETONES URINE: NEGATIVE
LDLC SERPL CALC-MCNC: 152 MG/DL
LEUKOCYTE ESTERASE URINE: ABNORMAL
NITRITE URINE: NEGATIVE
NONHDLC SERPL-MCNC: 183 MG/DL
PH URINE: 6
POTASSIUM SERPL-SCNC: 4.7 MMOL/L
PROT SERPL-MCNC: 7.1 G/DL
PROTEIN URINE: NEGATIVE
PSA SERPL-MCNC: 1.26 NG/ML
SODIUM SERPL-SCNC: 136 MMOL/L
SPECIFIC GRAVITY URINE: 1.01
TRIGL SERPL-MCNC: 154 MG/DL
TSH SERPL-ACNC: 2.02 UIU/ML
UROBILINOGEN URINE: NORMAL

## 2023-03-27 LAB
BARLEY IGE QN: <0.1 KUA/L
BASOPHILS # BLD AUTO: 0.09 K/UL
BASOPHILS NFR BLD AUTO: 1.3 %
CHERRY IGE QN: <0.1 KUA/L
COW MILK IGE QN: 0.24 KUA/L
CRAB IGE QN: <0.1 KUA/L
DEPRECATED BARLEY IGE RAST QL: 0
DEPRECATED CHERRY IGE RAST QL: 0
DEPRECATED COW MILK IGE RAST QL: NORMAL
DEPRECATED CRAB IGE RAST QL: 0
DEPRECATED EGG WHITE IGE RAST QL: 0
DEPRECATED OAT IGE RAST QL: 0
DEPRECATED PEANUT IGE RAST QL: 0
DEPRECATED RYE IGE RAST QL: 0
DEPRECATED SOYBEAN IGE RAST QL: 0
DEPRECATED WHEAT IGE RAST QL: 0
EGG WHITE IGE QN: <0.1 KUA/L
EOSINOPHIL # BLD AUTO: 0.25 K/UL
EOSINOPHIL NFR BLD AUTO: 3.5 %
HCT VFR BLD CALC: 56.7 %
HGB BLD-MCNC: 18.3 G/DL
IMM GRANULOCYTES NFR BLD AUTO: 2.2 %
LYMPHOCYTES # BLD AUTO: 1.1 K/UL
LYMPHOCYTES NFR BLD AUTO: 15.4 %
MAN DIFF?: NORMAL
MCHC RBC-ENTMCNC: 26 PG
MCHC RBC-ENTMCNC: 32.3 GM/DL
MCV RBC AUTO: 80.5 FL
MONOCYTES # BLD AUTO: 0.79 K/UL
MONOCYTES NFR BLD AUTO: 11.1 %
NEUTROPHILS # BLD AUTO: 4.74 K/UL
NEUTROPHILS NFR BLD AUTO: 66.5 %
OAT IGE QN: <0.1 KUA/L
PEANUT IGE QN: <0.1 KUA/L
PLATELET # BLD AUTO: 278 K/UL
RBC # BLD: 7.04 M/UL
RBC # FLD: 15.9 %
RYE IGE QN: <0.1 KUA/L
SOYBEAN IGE QN: <0.1 KUA/L
TOTAL IGE SMQN RAST: 60 KU/L
WBC # FLD AUTO: 7.13 K/UL
WHEAT IGE QN: <0.1 KUA/L

## 2023-03-28 DIAGNOSIS — R19.5 OTHER FECAL ABNORMALITIES: ICD-10-CM

## 2023-03-28 LAB
24R-OH-CALCIDIOL SERPL-MCNC: 49.3 PG/ML
HEMOCCULT STL QL IA: POSITIVE

## 2023-05-10 ENCOUNTER — LABORATORY RESULT (OUTPATIENT)
Age: 68
End: 2023-05-10

## 2023-05-23 ENCOUNTER — RX RENEWAL (OUTPATIENT)
Age: 68
End: 2023-05-23

## 2023-06-19 ENCOUNTER — RX RENEWAL (OUTPATIENT)
Age: 68
End: 2023-06-19

## 2023-06-19 RX ORDER — GEMFIBROZIL 600 MG/1
600 TABLET, FILM COATED ORAL TWICE DAILY
Qty: 180 | Refills: 0 | Status: ACTIVE | COMMUNITY
Start: 2023-06-19 | End: 1900-01-01

## 2023-07-24 ENCOUNTER — RX RENEWAL (OUTPATIENT)
Age: 68
End: 2023-07-24

## 2023-09-28 ENCOUNTER — RX RENEWAL (OUTPATIENT)
Age: 68
End: 2023-09-28

## 2023-10-02 RX ORDER — AZELASTINE HYDROCHLORIDE 137 UG/1
137 SPRAY, METERED NASAL
Qty: 1 | Refills: 1 | Status: ACTIVE | COMMUNITY
Start: 2023-05-23 | End: 1900-01-01

## 2024-01-23 ENCOUNTER — NON-APPOINTMENT (OUTPATIENT)
Age: 69
End: 2024-01-23

## 2024-01-31 NOTE — HISTORY OF PRESENT ILLNESS
[de-identified] : 66 y/o male with PMH GERD, HLD, HTN, COPD that present for physical. Patient is current smoker, last chest x ray was in 2019. Patient complaining of intermittent diarrhea. [FreeTextEntry1] : CPE\par

## 2024-02-01 ENCOUNTER — APPOINTMENT (OUTPATIENT)
Dept: FAMILY MEDICINE | Facility: CLINIC | Age: 69
End: 2024-02-01
Payer: MEDICARE

## 2024-02-01 VITALS
HEIGHT: 69 IN | RESPIRATION RATE: 14 BRPM | DIASTOLIC BLOOD PRESSURE: 80 MMHG | WEIGHT: 199 LBS | SYSTOLIC BLOOD PRESSURE: 110 MMHG | HEART RATE: 79 BPM | BODY MASS INDEX: 29.47 KG/M2 | OXYGEN SATURATION: 98 %

## 2024-02-01 DIAGNOSIS — F17.219 NICOTINE DEPENDENCE, CIGARETTES, WITH UNSPECIFIED NICOTINE-INDUCED DISORDERS: ICD-10-CM

## 2024-02-01 DIAGNOSIS — K57.90 DIVERTICULOSIS OF INTESTINE, PART UNSPECIFIED, W/OUT PERFORATION OR ABSCESS W/OUT BLEEDING: ICD-10-CM

## 2024-02-01 DIAGNOSIS — R19.8 OTHER SPECIFIED SYMPTOMS AND SIGNS INVOLVING THE DIGESTIVE SYSTEM AND ABDOMEN: ICD-10-CM

## 2024-02-01 DIAGNOSIS — I10 ESSENTIAL (PRIMARY) HYPERTENSION: ICD-10-CM

## 2024-02-01 DIAGNOSIS — K21.9 GASTRO-ESOPHAGEAL REFLUX DISEASE W/OUT ESOPHAGITIS: ICD-10-CM

## 2024-02-01 DIAGNOSIS — J43.9 EMPHYSEMA, UNSPECIFIED: ICD-10-CM

## 2024-02-01 DIAGNOSIS — E78.5 HYPERLIPIDEMIA, UNSPECIFIED: ICD-10-CM

## 2024-02-01 DIAGNOSIS — D75.1 SECONDARY POLYCYTHEMIA: ICD-10-CM

## 2024-02-01 PROCEDURE — G2211 COMPLEX E/M VISIT ADD ON: CPT

## 2024-02-01 PROCEDURE — 36415 COLL VENOUS BLD VENIPUNCTURE: CPT

## 2024-02-01 PROCEDURE — 99214 OFFICE O/P EST MOD 30 MIN: CPT

## 2024-02-01 NOTE — ASSESSMENT
[FreeTextEntry1] : follow up 69 y/o male with PMH GERD, HLD, HTN, COPD that present for labs. Patient is current smoker, not interested in stopping. stop smoking l1-4 ppd  care plan reviewed meds reviewed labs obtained fit

## 2024-02-01 NOTE — HEALTH RISK ASSESSMENT
[Good] : ~his/her~  mood as  good [2 - 4 times a month (2 pts)] : 2-4 times a month (2 points) [1 or 2 (0 pts)] : 1 or 2 (0 points) [Never (0 pts)] : Never (0 points) [No] : In the past 12 months have you used drugs other than those required for medical reasons? No [No falls in past year] : Patient reported no falls in the past year [0] : 2) Feeling down, depressed, or hopeless: Not at all (0) [PHQ-2 Negative - No further assessment needed] : PHQ-2 Negative - No further assessment needed [Patient/Caregiver not ready to engage] : , patient/caregiver not ready to engage [Current] : Current [HIV Test offered] : HIV Test offered [Hepatitis C test offered] : Hepatitis C test offered [None] : None [With Family] : lives with family [Employed] : employed [College] : College [Feels Safe at Home] : Feels safe at home [Neglect Or Abandonment] : neglect or abandonment [Fully functional (bathing, dressing, toileting, transferring, walking, feeding)] : Fully functional (bathing, dressing, toileting, transferring, walking, feeding) [Fully functional (using the telephone, shopping, preparing meals, housekeeping, doing laundry, using] : Fully functional and needs no help or supervision to perform IADLs (using the telephone, shopping, preparing meals, housekeeping, doing laundry, using transportation, managing medications and managing finances) [Smoke Detector] : smoke detector [Safety elements used in home] : safety elements used in home [20 or more] : 20 or more [Patient declined colonoscopy] : Patient declined colonoscopy [Carbon Monoxide Detector] : carbon monoxide detector [Sunscreen] : uses sunscreen [Audit-CScore] : 3 [[id number]] : 0 [Change in mental status noted] : No change in mental status noted [Language] : denies difficulty with language [] :  [Reports changes in hearing] : Reports no changes in hearing [Reports changes in vision] : Reports no changes in vision

## 2024-02-06 LAB
24R-OH-CALCIDIOL SERPL-MCNC: 23.4 PG/ML
ALBUMIN SERPL ELPH-MCNC: 4.4 G/DL
ALP BLD-CCNC: 52 U/L
ALT SERPL-CCNC: 14 U/L
ANION GAP SERPL CALC-SCNC: 17 MMOL/L
APPEARANCE: CLEAR
AST SERPL-CCNC: 21 U/L
BASOPHILS # BLD AUTO: 0.13 K/UL
BASOPHILS NFR BLD AUTO: 1.9 %
BILIRUB SERPL-MCNC: 0.4 MG/DL
BILIRUBIN URINE: NEGATIVE
BLOOD URINE: NEGATIVE
BUN SERPL-MCNC: 30 MG/DL
CALCIUM SERPL-MCNC: 9.4 MG/DL
CHLORIDE SERPL-SCNC: 100 MMOL/L
CO2 SERPL-SCNC: 18 MMOL/L
COLOR: YELLOW
CREAT SERPL-MCNC: 1.17 MG/DL
CRP SERPL HS-MCNC: 3.35 MG/L
EGFR: 68 ML/MIN/1.73M2
EOSINOPHIL # BLD AUTO: 0.3 K/UL
EOSINOPHIL NFR BLD AUTO: 4.3 %
ERYTHROCYTE [SEDIMENTATION RATE] IN BLOOD BY WESTERGREN METHOD: 22 MM/HR
GLUCOSE QUALITATIVE U: NEGATIVE MG/DL
GLUCOSE SERPL-MCNC: 95 MG/DL
HCT VFR BLD CALC: 55.5 %
HGB BLD-MCNC: 17.5 G/DL
IMM GRANULOCYTES NFR BLD AUTO: 4.3 %
KETONES URINE: NEGATIVE MG/DL
LEUKOCYTE ESTERASE URINE: NEGATIVE
LYMPHOCYTES # BLD AUTO: 1.38 K/UL
LYMPHOCYTES NFR BLD AUTO: 19.7 %
MAN DIFF?: NORMAL
MCHC RBC-ENTMCNC: 25.3 PG
MCHC RBC-ENTMCNC: 31.5 GM/DL
MCV RBC AUTO: 80.2 FL
MONOCYTES # BLD AUTO: 0.73 K/UL
MONOCYTES NFR BLD AUTO: 10.4 %
NEUTROPHILS # BLD AUTO: 4.17 K/UL
NEUTROPHILS NFR BLD AUTO: 59.4 %
NITRITE URINE: NEGATIVE
PH URINE: 6
PLATELET # BLD AUTO: 275 K/UL
POTASSIUM SERPL-SCNC: 5.2 MMOL/L
PROT SERPL-MCNC: 7 G/DL
PROTEIN URINE: NEGATIVE MG/DL
PSA SERPL-MCNC: 1.37 NG/ML
RBC # BLD: 6.92 M/UL
RBC # FLD: 15.7 %
RHEUMATOID FACT SER QL: <10 IU/ML
SODIUM SERPL-SCNC: 135 MMOL/L
SPECIFIC GRAVITY URINE: 1.01
TESTOST SERPL-MCNC: 689 NG/DL
TSH SERPL-ACNC: 3.45 UIU/ML
UROBILINOGEN URINE: 0.2 MG/DL
WBC # FLD AUTO: 7.01 K/UL

## 2024-02-20 LAB
CHOLEST SERPL-MCNC: 230 MG/DL
HDLC SERPL-MCNC: 39 MG/DL
LDLC SERPL CALC-MCNC: 169 MG/DL
NONHDLC SERPL-MCNC: 191 MG/DL
TRIGL SERPL-MCNC: 122 MG/DL

## 2024-07-16 ENCOUNTER — NON-APPOINTMENT (OUTPATIENT)
Age: 69
End: 2024-07-16

## 2024-07-25 ENCOUNTER — APPOINTMENT (OUTPATIENT)
Dept: FAMILY MEDICINE | Facility: CLINIC | Age: 69
End: 2024-07-25
Payer: MEDICARE

## 2024-07-25 VITALS
TEMPERATURE: 98.1 F | WEIGHT: 192 LBS | SYSTOLIC BLOOD PRESSURE: 120 MMHG | DIASTOLIC BLOOD PRESSURE: 70 MMHG | RESPIRATION RATE: 14 BRPM | HEIGHT: 69 IN | OXYGEN SATURATION: 95 % | HEART RATE: 76 BPM | BODY MASS INDEX: 28.44 KG/M2

## 2024-07-25 DIAGNOSIS — K21.9 GASTRO-ESOPHAGEAL REFLUX DISEASE W/OUT ESOPHAGITIS: ICD-10-CM

## 2024-07-25 DIAGNOSIS — Z00.00 ENCOUNTER FOR GENERAL ADULT MEDICAL EXAMINATION W/OUT ABNORMAL FINDINGS: ICD-10-CM

## 2024-07-25 DIAGNOSIS — J43.9 EMPHYSEMA, UNSPECIFIED: ICD-10-CM

## 2024-07-25 DIAGNOSIS — R05.3 CHRONIC COUGH: ICD-10-CM

## 2024-07-25 DIAGNOSIS — F17.219 NICOTINE DEPENDENCE, CIGARETTES, WITH UNSPECIFIED NICOTINE-INDUCED DISORDERS: ICD-10-CM

## 2024-07-25 DIAGNOSIS — D22.9 MELANOCYTIC NEVI, UNSPECIFIED: ICD-10-CM

## 2024-07-25 DIAGNOSIS — R91.8 OTHER NONSPECIFIC ABNORMAL FINDING OF LUNG FIELD: ICD-10-CM

## 2024-07-25 DIAGNOSIS — I10 ESSENTIAL (PRIMARY) HYPERTENSION: ICD-10-CM

## 2024-07-25 DIAGNOSIS — J30.9 ALLERGIC RHINITIS, UNSPECIFIED: ICD-10-CM

## 2024-07-25 DIAGNOSIS — E78.5 HYPERLIPIDEMIA, UNSPECIFIED: ICD-10-CM

## 2024-07-25 PROCEDURE — 36415 COLL VENOUS BLD VENIPUNCTURE: CPT

## 2024-07-25 PROCEDURE — 99397 PER PM REEVAL EST PAT 65+ YR: CPT

## 2024-07-25 NOTE — HEALTH RISK ASSESSMENT
[Good] : ~his/her~  mood as  good [2 - 4 times a month (2 pts)] : 2-4 times a month (2 points) [1 or 2 (0 pts)] : 1 or 2 (0 points) [Never (0 pts)] : Never (0 points) [No] : In the past 12 months have you used drugs other than those required for medical reasons? No [No falls in past year] : Patient reported no falls in the past year [0] : 2) Feeling down, depressed, or hopeless: Not at all (0) [PHQ-2 Negative - No further assessment needed] : PHQ-2 Negative - No further assessment needed [Current] : Current [HIV Test offered] : HIV Test offered [Hepatitis C test offered] : Hepatitis C test offered [None] : None [With Family] : lives with family [Employed] : employed [College] : College [Feels Safe at Home] : Feels safe at home [Neglect Or Abandonment] : neglect or abandonment [Fully functional (bathing, dressing, toileting, transferring, walking, feeding)] : Fully functional (bathing, dressing, toileting, transferring, walking, feeding) [Fully functional (using the telephone, shopping, preparing meals, housekeeping, doing laundry, using] : Fully functional and needs no help or supervision to perform IADLs (using the telephone, shopping, preparing meals, housekeeping, doing laundry, using transportation, managing medications and managing finances) [Smoke Detector] : smoke detector [Safety elements used in home] : safety elements used in home [Patient/Caregiver not ready to engage] : , patient/caregiver not ready to engage [Audit-CScore] : 3 [[id number]] : 0 [NO] : No [Change in mental status noted] : No change in mental status noted [Language] : denies difficulty with language [Reports changes in hearing] : Reports no changes in hearing [Reports changes in vision] : Reports no changes in vision

## 2024-07-25 NOTE — ASSESSMENT
[FreeTextEntry1] : CPE 69y/o male with PMH GERD, HLD, HTN, COPD that present for physical. Patient is current smoker 1 1/2 PPD last chest x ray was in 2019 Cough fatigue easy bruising has not seen cardio or pulmonary. Declined juan and EKG stop smoking 1.5 PPF 45 yrs does not want to stop meds reviewed labs obtained chest x ray

## 2024-07-25 NOTE — HISTORY OF PRESENT ILLNESS
[FreeTextEntry1] : CPE 67y/o male with PMH GERD, HLD, HTN, COPD that present for physical. Patient is current smoker 1 1/2 PPD last chest x ray was in 2019 Cough fatigue easy bruising has not seen cardio or pulmonary. Declined juan and EKG

## 2024-07-27 ENCOUNTER — TRANSCRIPTION ENCOUNTER (OUTPATIENT)
Age: 69
End: 2024-07-27

## 2024-07-29 DIAGNOSIS — N18.30 CHRONIC KIDNEY DISEASE, STAGE 3 UNSPECIFIED: ICD-10-CM

## 2024-07-29 LAB
ALBUMIN SERPL ELPH-MCNC: 4.2 G/DL
ALP BLD-CCNC: 52 U/L
ALT SERPL-CCNC: 13 U/L
ANION GAP SERPL CALC-SCNC: 14 MMOL/L
APPEARANCE: CLEAR
AST SERPL-CCNC: 17 U/L
BILIRUB SERPL-MCNC: 0.4 MG/DL
BILIRUBIN URINE: NEGATIVE
BLOOD URINE: NEGATIVE
BUN SERPL-MCNC: 41 MG/DL
CALCIUM SERPL-MCNC: 10.4 MG/DL
CHLORIDE SERPL-SCNC: 103 MMOL/L
CHOLEST SERPL-MCNC: 204 MG/DL
CO2 SERPL-SCNC: 16 MMOL/L
COLOR: YELLOW
CREAT SERPL-MCNC: 1.4 MG/DL
EGFR: 55 ML/MIN/1.73M2
GLUCOSE QUALITATIVE U: NEGATIVE MG/DL
GLUCOSE SERPL-MCNC: 117 MG/DL
HDLC SERPL-MCNC: 36 MG/DL
KETONES URINE: NEGATIVE MG/DL
LDLC SERPL CALC-MCNC: 147 MG/DL
LEUKOCYTE ESTERASE URINE: NEGATIVE
NITRITE URINE: NEGATIVE
NONHDLC SERPL-MCNC: 168 MG/DL
PH URINE: 6
POTASSIUM SERPL-SCNC: 4.9 MMOL/L
PROT SERPL-MCNC: 6.8 G/DL
PROTEIN URINE: NEGATIVE MG/DL
PSA SERPL-MCNC: 1.52 NG/ML
SODIUM SERPL-SCNC: 134 MMOL/L
SPECIFIC GRAVITY URINE: 1.01
TRIGL SERPL-MCNC: 115 MG/DL
TSH SERPL-ACNC: 2.74 UIU/ML
UROBILINOGEN URINE: 0.2 MG/DL

## 2024-07-30 ENCOUNTER — NON-APPOINTMENT (OUTPATIENT)
Age: 69
End: 2024-07-30

## 2024-07-30 LAB
BASOPHILS # BLD AUTO: 0.1 K/UL
BASOPHILS NFR BLD AUTO: 1.2 %
EOSINOPHIL # BLD AUTO: 0.28 K/UL
EOSINOPHIL NFR BLD AUTO: 3.4 %
HCT VFR BLD CALC: 48.9 %
HGB BLD-MCNC: 15.4 G/DL
IMM GRANULOCYTES NFR BLD AUTO: 1.8 %
LYMPHOCYTES # BLD AUTO: 1.07 K/UL
LYMPHOCYTES NFR BLD AUTO: 13 %
MAN DIFF?: NORMAL
MCHC RBC-ENTMCNC: 25.5 PG
MCHC RBC-ENTMCNC: 31.5 GM/DL
MCV RBC AUTO: 81.1 FL
MONOCYTES # BLD AUTO: 0.67 K/UL
MONOCYTES NFR BLD AUTO: 8.2 %
NEUTROPHILS # BLD AUTO: 5.94 K/UL
NEUTROPHILS NFR BLD AUTO: 72.4 %
PLATELET # BLD AUTO: 275 K/UL
RBC # BLD: 6.03 M/UL
RBC # FLD: 15.3 %
WBC # FLD AUTO: 8.21 K/UL